# Patient Record
Sex: MALE | Race: WHITE | NOT HISPANIC OR LATINO | URBAN - METROPOLITAN AREA
[De-identification: names, ages, dates, MRNs, and addresses within clinical notes are randomized per-mention and may not be internally consistent; named-entity substitution may affect disease eponyms.]

---

## 2018-07-05 ENCOUNTER — EMERGENCY (EMERGENCY)
Facility: HOSPITAL | Age: 73
LOS: 0 days | Discharge: HOME | End: 2018-07-05
Admitting: PHYSICIAN ASSISTANT

## 2018-07-05 VITALS — HEIGHT: 69 IN | WEIGHT: 175.93 LBS

## 2018-07-05 VITALS
DIASTOLIC BLOOD PRESSURE: 72 MMHG | TEMPERATURE: 98 F | HEART RATE: 81 BPM | RESPIRATION RATE: 18 BRPM | SYSTOLIC BLOOD PRESSURE: 136 MMHG | OXYGEN SATURATION: 97 %

## 2018-07-05 DIAGNOSIS — H60.11 CELLULITIS OF RIGHT EXTERNAL EAR: ICD-10-CM

## 2018-07-05 DIAGNOSIS — L53.9 ERYTHEMATOUS CONDITION, UNSPECIFIED: ICD-10-CM

## 2018-07-05 DIAGNOSIS — Z87.891 PERSONAL HISTORY OF NICOTINE DEPENDENCE: ICD-10-CM

## 2018-07-05 RX ADMIN — Medication 300 MILLIGRAM(S): at 15:51

## 2018-07-05 NOTE — ED PROVIDER NOTE - OBJECTIVE STATEMENT
73 y.o. male with a PMHx of anxiety presented to the ER c/o erythema to Right outer ear for the past 2 days.  Pt denies fever, chills, d/c.  Pt was tx for a Right otitis externa 3 weeks ago which did improve.  Pt states he has been scratching and picking at dry skin to outer ear.

## 2018-07-05 NOTE — ED PROVIDER NOTE - ENMT, MLM
Airway patent, Nasal mucosa clear. Mouth with normal mucosa. Throat has no vesicles, no oropharyngeal exudates and uvula is midline.  TM's clear bilaterally.  No canal edema or d/c bilaterally.  No tragal or mastoid tenderness bilaterally.  (+) erythema/tenderness/warmth Right external auricle

## 2018-07-05 NOTE — ED PROVIDER NOTE - MEDICAL DECISION MAKING DETAILS
po Clindamycin; return to ER in 24 hours for re-check; any new or worsening symptoms, pt will return to ER po Clindamycin; return to ER in 24 hours for re-check; any new or worsening symptoms, pt will return to ER  Note complete

## 2018-07-06 ENCOUNTER — EMERGENCY (EMERGENCY)
Facility: HOSPITAL | Age: 73
LOS: 0 days | Discharge: HOME | End: 2018-07-06
Admitting: PHYSICIAN ASSISTANT

## 2018-07-06 VITALS
HEART RATE: 77 BPM | RESPIRATION RATE: 20 BRPM | TEMPERATURE: 96 F | SYSTOLIC BLOOD PRESSURE: 115 MMHG | OXYGEN SATURATION: 96 % | DIASTOLIC BLOOD PRESSURE: 58 MMHG

## 2018-07-06 DIAGNOSIS — H92.09 OTALGIA, UNSPECIFIED EAR: ICD-10-CM

## 2018-07-06 DIAGNOSIS — Z79.2 LONG TERM (CURRENT) USE OF ANTIBIOTICS: ICD-10-CM

## 2018-07-06 DIAGNOSIS — H60.11 CELLULITIS OF RIGHT EXTERNAL EAR: ICD-10-CM

## 2018-07-06 RX ORDER — AZTREONAM 2 G
1 VIAL (EA) INJECTION
Qty: 20 | Refills: 0 | OUTPATIENT
Start: 2018-07-06 | End: 2018-07-15

## 2018-07-06 NOTE — ED PROVIDER NOTE - PHYSICAL EXAMINATION
CONST: Well appearing in NAD  EYES: PERRL, EOMI, Sclera and conjunctiva clear.   ENT: No nasal discharge. TM's clear B/L without drainage. Oropharynx normal appearing, no erythema or exudates. Uvula midline. Right ear with cellulitis, swelling and drainage from pinna and helix, Canal is clear. No mastoid tenderness  NECK: Non-tender, no meningeal signs  MS: Normal ROM in all extremities. No midline spinal tenderness.  NEURO: A&Ox3, No focal deficits. Strength 5/5 with no sensory deficits. Steady gait

## 2018-07-06 NOTE — ED ADULT TRIAGE NOTE - CHIEF COMPLAINT QUOTE
Pt states he was told to return for follow up of right ear itching & swelling, was seen in ED yesterday, s/p po abx x 3 doses

## 2018-07-06 NOTE — ED PROVIDER NOTE - OBJECTIVE STATEMENT
pt with right ear swelling redness and drainage x4 days. Pt seen here yesterday and started on clindamycin and told to return today for re evaluation. Pt states that the redness receded but now is draining from helix. Denies fever or chills, Denies facial numbness or weakness. Denies DM.

## 2018-07-09 ENCOUNTER — INPATIENT (INPATIENT)
Facility: HOSPITAL | Age: 73
LOS: 1 days | Discharge: HOME | End: 2018-07-11
Attending: INTERNAL MEDICINE | Admitting: INTERNAL MEDICINE
Payer: MEDICARE

## 2018-07-09 VITALS
HEART RATE: 92 BPM | RESPIRATION RATE: 16 BRPM | DIASTOLIC BLOOD PRESSURE: 61 MMHG | OXYGEN SATURATION: 96 % | TEMPERATURE: 97 F | SYSTOLIC BLOOD PRESSURE: 137 MMHG

## 2018-07-09 LAB
ALBUMIN SERPL ELPH-MCNC: 4.7 G/DL — SIGNIFICANT CHANGE UP (ref 3.5–5.2)
ALP SERPL-CCNC: 44 U/L — SIGNIFICANT CHANGE UP (ref 30–115)
ALT FLD-CCNC: 36 U/L — SIGNIFICANT CHANGE UP (ref 0–41)
ANION GAP SERPL CALC-SCNC: 13 MMOL/L — SIGNIFICANT CHANGE UP (ref 7–14)
AST SERPL-CCNC: 39 U/L — SIGNIFICANT CHANGE UP (ref 0–41)
BASOPHILS # BLD AUTO: 0.08 K/UL — SIGNIFICANT CHANGE UP (ref 0–0.2)
BASOPHILS NFR BLD AUTO: 0.6 % — SIGNIFICANT CHANGE UP (ref 0–1)
BILIRUB SERPL-MCNC: <0.2 MG/DL — SIGNIFICANT CHANGE UP (ref 0.2–1.2)
BUN SERPL-MCNC: 15 MG/DL — SIGNIFICANT CHANGE UP (ref 10–20)
CALCIUM SERPL-MCNC: 9.1 MG/DL — SIGNIFICANT CHANGE UP (ref 8.5–10.1)
CHLORIDE SERPL-SCNC: 101 MMOL/L — SIGNIFICANT CHANGE UP (ref 98–110)
CO2 SERPL-SCNC: 25 MMOL/L — SIGNIFICANT CHANGE UP (ref 17–32)
CREAT SERPL-MCNC: 1.1 MG/DL — SIGNIFICANT CHANGE UP (ref 0.7–1.5)
EOSINOPHIL # BLD AUTO: 0.58 K/UL — SIGNIFICANT CHANGE UP (ref 0–0.7)
EOSINOPHIL NFR BLD AUTO: 4.6 % — SIGNIFICANT CHANGE UP (ref 0–8)
GLUCOSE SERPL-MCNC: 128 MG/DL — HIGH (ref 70–99)
HCT VFR BLD CALC: 43.8 % — SIGNIFICANT CHANGE UP (ref 42–52)
HGB BLD-MCNC: 14.5 G/DL — SIGNIFICANT CHANGE UP (ref 14–18)
IMM GRANULOCYTES NFR BLD AUTO: 0.5 % — HIGH (ref 0.1–0.3)
LACTATE SERPL-SCNC: 1.5 MMOL/L — SIGNIFICANT CHANGE UP (ref 0.5–2.2)
LYMPHOCYTES # BLD AUTO: 2.53 K/UL — SIGNIFICANT CHANGE UP (ref 1.2–3.4)
LYMPHOCYTES # BLD AUTO: 20 % — LOW (ref 20.5–51.1)
MCHC RBC-ENTMCNC: 29.4 PG — SIGNIFICANT CHANGE UP (ref 27–31)
MCHC RBC-ENTMCNC: 33.1 G/DL — SIGNIFICANT CHANGE UP (ref 32–37)
MCV RBC AUTO: 88.7 FL — SIGNIFICANT CHANGE UP (ref 80–94)
MONOCYTES # BLD AUTO: 0.97 K/UL — HIGH (ref 0.1–0.6)
MONOCYTES NFR BLD AUTO: 7.7 % — SIGNIFICANT CHANGE UP (ref 1.7–9.3)
NEUTROPHILS # BLD AUTO: 8.4 K/UL — HIGH (ref 1.4–6.5)
NEUTROPHILS NFR BLD AUTO: 66.6 % — SIGNIFICANT CHANGE UP (ref 42.2–75.2)
NRBC # BLD: 0 /100 WBCS — SIGNIFICANT CHANGE UP (ref 0–0)
PLATELET # BLD AUTO: 282 K/UL — SIGNIFICANT CHANGE UP (ref 130–400)
POTASSIUM SERPL-MCNC: 4.9 MMOL/L — SIGNIFICANT CHANGE UP (ref 3.5–5)
POTASSIUM SERPL-SCNC: 4.9 MMOL/L — SIGNIFICANT CHANGE UP (ref 3.5–5)
PROT SERPL-MCNC: 7.4 G/DL — SIGNIFICANT CHANGE UP (ref 6–8)
RBC # BLD: 4.94 M/UL — SIGNIFICANT CHANGE UP (ref 4.7–6.1)
RBC # FLD: 13 % — SIGNIFICANT CHANGE UP (ref 11.5–14.5)
SODIUM SERPL-SCNC: 139 MMOL/L — SIGNIFICANT CHANGE UP (ref 135–146)
WBC # BLD: 12.62 K/UL — HIGH (ref 4.8–10.8)
WBC # FLD AUTO: 12.62 K/UL — HIGH (ref 4.8–10.8)

## 2018-07-09 RX ORDER — VANCOMYCIN HCL 1 G
1000 VIAL (EA) INTRAVENOUS ONCE
Qty: 0 | Refills: 0 | Status: COMPLETED | OUTPATIENT
Start: 2018-07-09 | End: 2018-07-09

## 2018-07-09 RX ORDER — CEFTRIAXONE 500 MG/1
1 INJECTION, POWDER, FOR SOLUTION INTRAMUSCULAR; INTRAVENOUS ONCE
Qty: 0 | Refills: 0 | Status: COMPLETED | OUTPATIENT
Start: 2018-07-09 | End: 2018-07-09

## 2018-07-09 RX ADMIN — Medication 250 MILLIGRAM(S): at 20:33

## 2018-07-09 RX ADMIN — CEFTRIAXONE 100 GRAM(S): 500 INJECTION, POWDER, FOR SOLUTION INTRAMUSCULAR; INTRAVENOUS at 19:00

## 2018-07-09 NOTE — ED PROVIDER NOTE - PHYSICAL EXAMINATION
VITAL SIGNS: I have reviewed nursing notes and confirm.  CONSTITUTIONAL: elderly m, nad  SKIN: see ENT exam; remainder of skin exam is warm and dry, no acute rash.  HEAD: Normocephalic; atraumatic.  EYES: PERRL, EOM intact; conjunctiva and sclera clear.  ENT: +erythema/edema/desquamation of R pinna w/mild drainage/crusting, no discrete fluctuance, EAC/TM clear, erythema noted over post auricular area and L lateral neck, +ttp over mastoid, L eac/tm clear, no nasal discharge; airway clear.  NECK: Supple; non tender. no lad.  CARD: S1, S2 normal; no murmurs, gallops, or rubs. Regular rate and rhythm.  RESP: No wheezes, rales or rhonchi.  ABD: Normal bowel sounds; soft; non-distended; non-tender; no hepatosplenomegaly.  EXT: Normal ROM. No clubbing, cyanosis or edema.  NEURO: Alert, oriented. Grossly unremarkable. No focal deficits.  PSYCH: Cooperative, appropriate.

## 2018-07-09 NOTE — ED PROVIDER NOTE - MEDICAL DECISION MAKING DETAILS
worsening R outer ear cellulitis, failed po abx, now w/erythema spreading twd post auricular area/mastoid area - labs, ct omfs, iv abx, will likely require admission

## 2018-07-09 NOTE — ED PROVIDER NOTE - OBJECTIVE STATEMENT
73y m no pmh p/w worsening R ear cellulitis. Rpts erythema/desquamation of R outer ear pinna/helix, w/mild drainage from skin. Third ED visit for same in last 4 days, has been on clinda & bactrim w/o improvement. Was also dx w/OE a few weeks ago by outside provider, s/p abx drops. Now w/erythema spreading to post auricular area and down lateral neck. Denies f/c, ha, vision changes, rhinorrhea, cp/sob, nv, abd pain.

## 2018-07-09 NOTE — ED PROVIDER NOTE - NS ED ROS FT
Constitutional: No fever, chills, unintended weight loss.  Eyes:  No visual changes, eye pain or discharge.  ENMT:  see hpi  Cardiac:  No chest pain, SOB or edema. No chest pain with exertion.  Respiratory:  No cough or respiratory distress. No hemoptysis. No history of asthma or RAD.  GI:  No nausea, vomiting, diarrhea or abdominal pain.  :  No dysuria, frequency or burning.  MS:  No myalgia, muscle weakness, joint pain or back pain.  Neuro:  No headache or weakness.  No LOC.  Skin:  see hpi  Endocrine: No history of thyroid disease or diabetes.

## 2018-07-10 LAB
ANION GAP SERPL CALC-SCNC: 12 MMOL/L — SIGNIFICANT CHANGE UP (ref 7–14)
BASOPHILS # BLD AUTO: 0.08 K/UL — SIGNIFICANT CHANGE UP (ref 0–0.2)
BASOPHILS NFR BLD AUTO: 0.7 % — SIGNIFICANT CHANGE UP (ref 0–1)
BUN SERPL-MCNC: 13 MG/DL — SIGNIFICANT CHANGE UP (ref 10–20)
CALCIUM SERPL-MCNC: 9.2 MG/DL — SIGNIFICANT CHANGE UP (ref 8.5–10.1)
CHLORIDE SERPL-SCNC: 100 MMOL/L — SIGNIFICANT CHANGE UP (ref 98–110)
CO2 SERPL-SCNC: 26 MMOL/L — SIGNIFICANT CHANGE UP (ref 17–32)
CREAT SERPL-MCNC: 1.1 MG/DL — SIGNIFICANT CHANGE UP (ref 0.7–1.5)
EOSINOPHIL # BLD AUTO: 0.74 K/UL — HIGH (ref 0–0.7)
EOSINOPHIL NFR BLD AUTO: 6.8 % — SIGNIFICANT CHANGE UP (ref 0–8)
GLUCOSE SERPL-MCNC: 81 MG/DL — SIGNIFICANT CHANGE UP (ref 70–99)
HCT VFR BLD CALC: 41.6 % — LOW (ref 42–52)
HGB BLD-MCNC: 13.6 G/DL — LOW (ref 14–18)
IMM GRANULOCYTES NFR BLD AUTO: 0.5 % — HIGH (ref 0.1–0.3)
LACTATE SERPL-SCNC: 1.2 MMOL/L — SIGNIFICANT CHANGE UP (ref 0.5–2.2)
LYMPHOCYTES # BLD AUTO: 2.58 K/UL — SIGNIFICANT CHANGE UP (ref 1.2–3.4)
LYMPHOCYTES # BLD AUTO: 23.7 % — SIGNIFICANT CHANGE UP (ref 20.5–51.1)
MCHC RBC-ENTMCNC: 28.9 PG — SIGNIFICANT CHANGE UP (ref 27–31)
MCHC RBC-ENTMCNC: 32.7 G/DL — SIGNIFICANT CHANGE UP (ref 32–37)
MCV RBC AUTO: 88.3 FL — SIGNIFICANT CHANGE UP (ref 80–94)
MONOCYTES # BLD AUTO: 1.2 K/UL — HIGH (ref 0.1–0.6)
MONOCYTES NFR BLD AUTO: 11 % — HIGH (ref 1.7–9.3)
NEUTROPHILS # BLD AUTO: 6.22 K/UL — SIGNIFICANT CHANGE UP (ref 1.4–6.5)
NEUTROPHILS NFR BLD AUTO: 57.3 % — SIGNIFICANT CHANGE UP (ref 42.2–75.2)
NRBC # BLD: 0 /100 WBCS — SIGNIFICANT CHANGE UP (ref 0–0)
PLATELET # BLD AUTO: 285 K/UL — SIGNIFICANT CHANGE UP (ref 130–400)
POTASSIUM SERPL-MCNC: 4.7 MMOL/L — SIGNIFICANT CHANGE UP (ref 3.5–5)
POTASSIUM SERPL-SCNC: 4.7 MMOL/L — SIGNIFICANT CHANGE UP (ref 3.5–5)
RBC # BLD: 4.71 M/UL — SIGNIFICANT CHANGE UP (ref 4.7–6.1)
RBC # FLD: 12.9 % — SIGNIFICANT CHANGE UP (ref 11.5–14.5)
SODIUM SERPL-SCNC: 138 MMOL/L — SIGNIFICANT CHANGE UP (ref 135–146)
WBC # BLD: 10.87 K/UL — HIGH (ref 4.8–10.8)
WBC # FLD AUTO: 10.87 K/UL — HIGH (ref 4.8–10.8)

## 2018-07-10 PROCEDURE — 99223 1ST HOSP IP/OBS HIGH 75: CPT

## 2018-07-10 RX ORDER — CEFAZOLIN SODIUM 1 G
2000 VIAL (EA) INJECTION EVERY 6 HOURS
Qty: 0 | Refills: 0 | Status: DISCONTINUED | OUTPATIENT
Start: 2018-07-10 | End: 2018-07-11

## 2018-07-10 RX ORDER — TAMSULOSIN HYDROCHLORIDE 0.4 MG/1
0.4 CAPSULE ORAL AT BEDTIME
Qty: 0 | Refills: 0 | Status: DISCONTINUED | OUTPATIENT
Start: 2018-07-10 | End: 2018-07-11

## 2018-07-10 RX ORDER — CIPROFLOXACIN LACTATE 400MG/40ML
400 VIAL (ML) INTRAVENOUS EVERY 12 HOURS
Qty: 0 | Refills: 0 | Status: DISCONTINUED | OUTPATIENT
Start: 2018-07-10 | End: 2018-07-10

## 2018-07-10 RX ORDER — HEPARIN SODIUM 5000 [USP'U]/ML
5000 INJECTION INTRAVENOUS; SUBCUTANEOUS EVERY 8 HOURS
Qty: 0 | Refills: 0 | Status: DISCONTINUED | OUTPATIENT
Start: 2018-07-10 | End: 2018-07-11

## 2018-07-10 RX ORDER — ASPIRIN/CALCIUM CARB/MAGNESIUM 324 MG
81 TABLET ORAL DAILY
Qty: 0 | Refills: 0 | Status: DISCONTINUED | OUTPATIENT
Start: 2018-07-10 | End: 2018-07-11

## 2018-07-10 RX ORDER — CIPROFLOXACIN LACTATE 400MG/40ML
400 VIAL (ML) INTRAVENOUS ONCE
Qty: 0 | Refills: 0 | Status: COMPLETED | OUTPATIENT
Start: 2018-07-10 | End: 2018-07-10

## 2018-07-10 RX ORDER — ALPRAZOLAM 0.25 MG
0.5 TABLET ORAL
Qty: 0 | Refills: 0 | Status: DISCONTINUED | OUTPATIENT
Start: 2018-07-10 | End: 2018-07-11

## 2018-07-10 RX ORDER — CIPROFLOXACIN AND DEXAMETHASONE 3; 1 MG/ML; MG/ML
4 SUSPENSION/ DROPS AURICULAR (OTIC) EVERY 12 HOURS
Qty: 0 | Refills: 0 | Status: DISCONTINUED | OUTPATIENT
Start: 2018-07-10 | End: 2018-07-10

## 2018-07-10 RX ORDER — OFLOXACIN 0.3 %
4 DROPS OPHTHALMIC (EYE)
Qty: 0 | Refills: 0 | Status: DISCONTINUED | OUTPATIENT
Start: 2018-07-10 | End: 2018-07-10

## 2018-07-10 RX ORDER — MUPIROCIN 20 MG/G
1 OINTMENT TOPICAL EVERY 12 HOURS
Qty: 0 | Refills: 0 | Status: DISCONTINUED | OUTPATIENT
Start: 2018-07-10 | End: 2018-07-11

## 2018-07-10 RX ORDER — CIPROFLOXACIN LACTATE 400MG/40ML
VIAL (ML) INTRAVENOUS
Qty: 0 | Refills: 0 | Status: DISCONTINUED | OUTPATIENT
Start: 2018-07-10 | End: 2018-07-10

## 2018-07-10 RX ADMIN — Medication 200 MILLIGRAM(S): at 02:56

## 2018-07-10 RX ADMIN — HEPARIN SODIUM 5000 UNIT(S): 5000 INJECTION INTRAVENOUS; SUBCUTANEOUS at 23:18

## 2018-07-10 RX ADMIN — Medication 100 MILLIGRAM(S): at 20:58

## 2018-07-10 RX ADMIN — MUPIROCIN 1 APPLICATION(S): 20 OINTMENT TOPICAL at 23:17

## 2018-07-10 RX ADMIN — TAMSULOSIN HYDROCHLORIDE 0.4 MILLIGRAM(S): 0.4 CAPSULE ORAL at 23:19

## 2018-07-10 RX ADMIN — Medication 100 MILLIGRAM(S): at 23:18

## 2018-07-10 RX ADMIN — HEPARIN SODIUM 5000 UNIT(S): 5000 INJECTION INTRAVENOUS; SUBCUTANEOUS at 06:27

## 2018-07-10 RX ADMIN — Medication 81 MILLIGRAM(S): at 11:40

## 2018-07-10 RX ADMIN — HEPARIN SODIUM 5000 UNIT(S): 5000 INJECTION INTRAVENOUS; SUBCUTANEOUS at 15:35

## 2018-07-10 NOTE — CONSULT NOTE ADULT - ASSESSMENT
Pt is a 73 y.o male with R perichondritis/chondritis, no OE.    ·	cont IV abx, consider adding MRSA coverage  ·	no need for ear drops  ·	ID c/s  ·	w/d with attng Pt is a 73 y.o male with R perichondritis/chondritis, no OE.    ·	cont IV abx, consider adding MRSA coverage  ·	Bactroban ointment to ear  ·	no need for ear drops  ·	ID c/s  ·	w/d with attng

## 2018-07-10 NOTE — H&P ADULT - ASSESSMENT
72 yo M hx of recurrent ear infection presents with worsening right ear cellulitis.      Recurrent right otitis externa  - CT Maxillofacial: Bilateral mastoid air cells are well-aerated without evidence of mastoiditis.  - c/w IV Cipro 400mg q12h  - ID consulted.       Diet: Regular diet.   DVT ppx:   GI ppx: not indicated.   Code: Full code. 72 yo M hx of recurrent ear infection presents with worsening right ear cellulitis.      Recurrent right otitis externa  - CT Maxillofacial: Bilateral mastoid air cells are well-aerated without evidence of mastoiditis.  - c/w IV Cipro 400mg q12h  - f/u BCx  - ID consulted.       Diet: Regular diet.   DVT ppx: heparin SC  GI ppx: not indicated.   Code: Full code. 74 yo M no pertinent PMH presents with worsening right ear cellulitis.      Recurrent right otitis externa  - CT Maxillofacial: Bilateral mastoid air cells are well-aerated without evidence of mastoiditis.  - c/w IV Cipro 400mg q12h  - f/u BCx  - ID consulted.       Diet: Regular diet.   DVT ppx: heparin SC  GI ppx: not indicated.   Code: Full code. 74 yo M no pertinent PMH presents with worsening right ear cellulitis.      Recurrent right otitis externa  - CT Maxillofacial: Bilateral mastoid air cells are well-aerated without evidence of mastoiditis.  - c/w IV Cipro 400mg q12h  - f/u BCx  - ID consulted.     BPH  - pt is taking rapaflo 8mg once daily  - c/w tamsulosin 0.4mg once daily.     Depression  - patient is taking Viibryd at home.       Diet: Regular diet.   DVT ppx: heparin SC  GI ppx: not indicated.   Code: Full code.

## 2018-07-10 NOTE — CONSULT NOTE ADULT - ATTENDING COMMENTS
right perichondritis. Medical treatment at this time.
I have personally examined the patient and reviewed the documentation above.  Corrections and edits were made wherever needed.

## 2018-07-10 NOTE — CONSULT NOTE ADULT - SUBJECTIVE AND OBJECTIVE BOX
LELAND BERMEO  73y, Male  Allergy: No Known Allergies      HPI:  74 yo M with no pertinent PMH presents with worsening right ear cellulitis. Patient reports that it started about 3 weeks ago with a "pimple" which he burst, then he started noticing erythema/desquamation of right outer ear pinna/helix. A few day prior, he was swimming in his friend's pool. He was treating by himself at home. When it got worse and started draining clear fluid, he went to his PCP who prescribed him abx drops. He was using the drops for about 1 wk without improvement in symptoms. The patient then visit to ED but discharge home on PO abx (bactrim/clinda). Patient reports that despite PO abx, his symptoms didn't improved so he decided to come back to ED again. Never had ear infection in the past.   Denied fever, chills, H/A, hearing changes, vision changes, rhinorrhea chest pain, SOB. (10 Jul 2018 04:23)    FAMILY HISTORY:    PAST MEDICAL & SURGICAL HISTORY:  Depression  BPH (benign prostatic hyperplasia)  Anxiety  No significant past surgical history        VITALS:  T(F): 96.6, Max: 98.6 (07-10-18 @ 02:39)  HR: 68  BP: 129/60  RR: 18Vital Signs Last 24 Hrs  T(C): 35.9 (10 Jul 2018 07:51), Max: 37 (10 Jul 2018 02:39)  T(F): 96.6 (10 Jul 2018 07:51), Max: 98.6 (10 Jul 2018 02:39)  HR: 68 (10 Jul 2018 07:51) (68 - 75)  BP: 129/60 (10 Jul 2018 07:51) (122/76 - 129/60)  BP(mean): --  RR: 18 (10 Jul 2018 07:51) (18 - 18)  SpO2: 95% (10 Jul 2018 07:51) (95% - 98%)    TESTS & MEASUREMENTS:                        13.6   10.87 )-----------( 285      ( 10 Jul 2018 11:19 )             41.6     07-10    138  |  100  |  13  ----------------------------<  81  4.7   |  26  |  1.1    Ca    9.2      10 Jul 2018 11:19    TPro  7.4  /  Alb  4.7  /  TBili  <0.2  /  DBili  x   /  AST  39  /  ALT  36  /  AlkPhos  44  07-09    LIVER FUNCTIONS - ( 09 Jul 2018 17:32 )  Alb: 4.7 g/dL / Pro: 7.4 g/dL / ALK PHOS: 44 U/L / ALT: 36 U/L / AST: 39 U/L / GGT: x             PHYSICAL EXAM:  HEENT: L ear no erythema/induration/abrasions; R entire ear edematous (including pinna, tragus, lobe), erythema of ear with no spreading to cheeks or forehead, yellowish crusting over ear, no vesicles or pustules noted, small abrasion over outer helix no pus, as per pt - no difference in hearing b/l      RADIOLOGY & ADDITIONAL TESTS:      ANTIBIOTICS:  ciprofloxacin   IVPB 400 milliGRAM(s) IV Intermittent every 12 hours  ciprofloxacin   IVPB

## 2018-07-10 NOTE — H&P ADULT - NSHPLABSRESULTS_GEN_ALL_CORE
14.5   12.62 )-----------( 282      ( 09 Jul 2018 17:32 )             43.8             07-09    139  |  101  |  15  ----------------------------<  128<H>  4.9   |  25  |  1.1    Ca    9.1      09 Jul 2018 17:32    TPro  7.4  /  Alb  4.7  /  TBili  <0.2  /  DBili  x   /  AST  39  /  ALT  36  /  AlkPhos  44  07-09    LIVER FUNCTIONS - ( 09 Jul 2018 17:32 )  Alb: 4.7 g/dL / Pro: 7.4 g/dL / ALK PHOS: 44 U/L / ALT: 36 U/L / AST: 39 U/L / GGT: x

## 2018-07-10 NOTE — H&P ADULT - ATTENDING COMMENTS
74 yo M with no pertinent PMH presents with worsening right ear cellulitis. Patient reports that it started about 3 weeks ago when he started noticing erythema/desquamation of right outer ear pinna/helix. A few day prior, he was swimming in his friend's pool. He was treating by himself at home. When it got worse and started draining clear fluid, he went to his PCP who prescribed him abx drops. He was using the drops for about 1 wk without improvement in symptoms. The patient then visit to ED but discharge home on PO abx (bactrim/clinda). Patient reports that despite PO abx, his symptoms didn't improved so he decided to come back to ED again. Never had ear infection in the past.   Denied fever, chills, H/A, hearing changes, vision changes, rhinorrhea chest pain, SOB.    REVIEW OF SYSTEMS:see cc HPI  CONSTITUTIONAL: No weakness, fevers or chills  EYES/ENT: No visual changes;  No vertigo or throat pain, swelling and infection of R pinna  NECK: No pain or stiffness  RESPIRATORY: No cough, wheezing, hemoptysis; No shortness of breath  CARDIOVASCULAR: No chest pain or palpitations  GASTROINTESTINAL: No abdominal or epigastric pain. No nausea, vomiting, or hematemesis; No diarrhea or constipation. No melena or hematochezia.  GENITOURINARY: No dysuria, frequency or hematuria  NEUROLOGICAL: No numbness or weakness  SKIN: No itching,(+) rashes, (+) swelling/erythema of the R pinna,     Physical Exam:  General: WN/WD NAD  Neurology: A&Ox3, nonfocal, follows commands  Eyes: PERRLA/ EOMI  ENT/Neck: Neck supple, trachea midline, No JVD, R ear swelling, erythema and tenderness w/ erythema extending to the neck area  Respiratory: CTA B/L, No wheezing, rales, rhonchi  CV: Normal rate regular rhythm, S1S2, no murmurs, rubs or gallops  Abdominal: Soft, NT, ND +BS,   Extremities: No edema, + peripheral pulses  Skin: No Rashes, Hematoma, Ecchymosis, see ENT area  Incisions: n/a  Tubes: n/a    A/P  R pinna cellulitis   - IV Abx and Otic drops  -ID eval   -Blood cultures  -ENT eval     Depression  - c/w outpatient Rx     BPH   - c/w outpatient Rx    DVT prophylaxis

## 2018-07-10 NOTE — H&P ADULT - NSHPREVIEWOFSYSTEMS_GEN_ALL_CORE
REVIEW OF SYSTEMS:    CONSTITUTIONAL: No weakness, fevers or chills  EYES/ENT: No visual changes;  No vertigo or throat pain. Right ear pain and discharge.   NECK: No pain or stiffness  RESPIRATORY: No cough, wheezing, hemoptysis; No shortness of breath  CARDIOVASCULAR: No chest pain or palpitations  GASTROINTESTINAL: No abdominal or epigastric pain. No nausea, vomiting, or hematemesis; No diarrhea or constipation. No melena or hematochezia.  GENITOURINARY: No dysuria, frequency or hematuria  NEUROLOGICAL: No numbness or weakness  SKIN: No itching, rashes REVIEW OF SYSTEMS:    CONSTITUTIONAL: No weakness, fevers or chills  EYES/ENT: No visual changes;  No vertigo or throat pain. Right ear pain/tenderness and discharge.   NECK: No pain or stiffness  RESPIRATORY: No cough, wheezing, hemoptysis; No shortness of breath  CARDIOVASCULAR: No chest pain or palpitations  GASTROINTESTINAL: No abdominal or epigastric pain. No nausea, vomiting, or hematemesis; No diarrhea or constipation. No melena or hematochezia.  GENITOURINARY: No dysuria, frequency or hematuria  NEUROLOGICAL: No numbness or weakness  SKIN: No itching, rashes

## 2018-07-10 NOTE — H&P ADULT - HISTORY OF PRESENT ILLNESS
74 yo M hx of recurrent ear infection presents with worsening right ear cellulitis. Patient reports that there's erythema/desquamation of R outer ear pinna/helix, w/mild drainage from skin. Pt was tx for a Right otitis externa 3 weeks ago which improved.  Third ED visit for same in last 4 days, has been on clinda & bactrim w/o improvement. Was also dx w/OE a few weeks ago by outside provider, s/p abx drops. Now w/erythema spreading to post auricular area and down lateral neck. Denies f/c, ha, vision changes, rhinorrhea, cp/sob, nv, abd pain. 74 yo M hx of recurrent ear infection presents with worsening right ear cellulitis. Patient reports that it started about 3 weeks ago when he started noticing erythema/desquamation of right outer ear pinna/helix. A few day prior, he was swimming in his friend's pool. He was treating by himself at home. When it got worse and started draining clear fluid, he went to his PCP who prescribed him abx drops. He was using the drops for about 1 wk without improvement in symptoms. The patient then visit to ED but discharge home on PO abx (bactrim/clinda). Patient reports that despite PO abx, his symptoms didn't improved so he decided to come back to ED again.   Denied fever, chills, H/A, hearing changes, vision changes, rhinorrhea chest pain, SOB. 74 yo M with no pertinent PMH presents with worsening right ear cellulitis. Patient reports that it started about 3 weeks ago when he started noticing erythema/desquamation of right outer ear pinna/helix. A few day prior, he was swimming in his friend's pool. He was treating by himself at home. When it got worse and started draining clear fluid, he went to his PCP who prescribed him abx drops. He was using the drops for about 1 wk without improvement in symptoms. The patient then visit to ED but discharge home on PO abx (bactrim/clinda). Patient reports that despite PO abx, his symptoms didn't improved so he decided to come back to ED again. Never had ear infection in the past.   Denied fever, chills, H/A, hearing changes, vision changes, rhinorrhea chest pain, SOB.

## 2018-07-10 NOTE — CONSULT NOTE ADULT - ASSESSMENT
Pt is 72yo M with R ear cellulitis after opening a pimple with no signs of impact on hearing. Leukocytosis 13 > 10 in ED, hemodynamic stable, afebrile.    PLAN:   - D/c ciprofloxacin, ofloxacin solution  - start Ancef 2g q6h  - start Bactroban cream q12h to R ear  - continue to monitor CBC Pt is 72yo M with R ear cellulitis after opening a pimple with no signs of impact on hearing. Leukocytosis 13 > 10 in ED, hemodynamic stable, afebrile.  Differential diagnosis includes perichondritis, Leija Hunt syndrome, mastoiditis, otitis externa    PLAN:   - D/c ciprofloxacin, ofloxacin solution  - start Ancef 2g q6h  - start Bactroban cream q12h to R ear  - continue to monitor CBC

## 2018-07-10 NOTE — CONSULT NOTE ADULT - COMMENTS
Left ear with edema/ erythema of entire ear including the pinna, no vesicles, tender to palpation, desquamation, no drainage from ear canal. Has involvement of the preauricular and post auricular area too.

## 2018-07-10 NOTE — H&P ADULT - NSHPSOCIALHISTORY_GEN_ALL_CORE
Alcohol -   Smoke -   Illicit drug - Alcohol - Social drink.   Smoke - smoked 1/2 pack a day for 20 years (quitted 8 mo ago)  Illicit drug - Denied.

## 2018-07-10 NOTE — H&P ADULT - NSHPPHYSICALEXAM_GEN_ALL_CORE
REVIEW OF SYSTEMS:    CONSTITUTIONAL: No weakness, fevers or chills  EYES/ENT: No visual changes;  No vertigo or throat pain   NECK: No pain or stiffness  RESPIRATORY: No cough, wheezing, hemoptysis; No shortness of breath  CARDIOVASCULAR: No chest pain or palpitations  GASTROINTESTINAL: No abdominal or epigastric pain. No nausea, vomiting, or hematemesis; No diarrhea or constipation. No melena or hematochezia.  GENITOURINARY: No dysuria, frequency or hematuria  NEUROLOGICAL: No numbness or weakness  SKIN: No itching, rashes PHYSICAL EXAM:  GEN: No acute distress.  HEENT: EOMI. Right ear swelling with erythema and crusted. Tenderness to palpation. TM looks clear (no erythema).  LUNGS: Clear to auscultation bilaterally.  HEART: S1/S2 present. RRR.   ABD: Soft, non-tender, non-distended. Bowel sounds present  EXT: No pitting edema.   NEURO: AAO x 4.

## 2018-07-10 NOTE — CONSULT NOTE ADULT - SUBJECTIVE AND OBJECTIVE BOX
Pt is a 73 y.o male admitted with recurrent "otitis externa" for the past few weeks. As per patient, he has been on some ear drops and PO abx, and has been using Bacitracin ointment without any relief. PT has been to the ED multiple times without any improvement. PT this AM feels better after getting some IV abx (admitted last night). PT denies any drainage from the ear, just has external itchiness and drainage. PT denies any fevers at home. Pt has never had any ear infections prior to this.    PAST MEDICAL: Depression, BPH, Anxiety  SURGICAL HISTORY: No significant past surgical history  MEDICATIONS (HOME): Aspirin (81), Rapaflo, Viibryd, Xanax  MEDICATIONS (STANDING):  Aspirin chewable 81 milliGRAM(s) Oral daily  Ciprofloxacin IVPB 400 milliGRAM(s) IV Intermittent every 12 hours  Dexamethasone 0.1% Ophthalmic Solution for OTIC Use 4 Drop(s) Right Ear two times a day  Heparin Injectable 5000 Unit(s) SubCutaneous every 8 hours  Ofloxacin 0.3% Solution 4 Drop(s) Right Ear two times a day  Tamsulosin 0.4 milliGRAM(s) Oral at bedtime  ALLERGIES: NKDA    Vital Signs: T(F): 96.6 (10 Jul 2018 07:51), Max: 98.6 (10 Jul 2018 02:39), HR: 68, BP: 129/60, RR: 18, SpO2: 95%  GEN: NAD, awake and alert  HEENT: NC/AT, oral mucosa pink, no erythema/edema, uvula midline;   R external ear with erythema/mild edema and dry scaly/skin extending to post auricular area and down to neck, no areas of drainage, some mucosal bleeding noted; no TTP over pre/post auricular or tragal area; EAC WNL, no edema, erythema, TM intact. L ear externally WNL, no pre/post auricular lesions, EAC WNL, TM intact                          13.6   10.87 )-----------( 285      ( 10 Jul 2018 11:19 )             41.6

## 2018-07-11 VITALS
HEART RATE: 78 BPM | RESPIRATION RATE: 16 BRPM | SYSTOLIC BLOOD PRESSURE: 133 MMHG | TEMPERATURE: 96 F | DIASTOLIC BLOOD PRESSURE: 58 MMHG

## 2018-07-11 LAB
ANION GAP SERPL CALC-SCNC: 10 MMOL/L — SIGNIFICANT CHANGE UP (ref 7–14)
BASOPHILS # BLD AUTO: 0.08 K/UL — SIGNIFICANT CHANGE UP (ref 0–0.2)
BASOPHILS NFR BLD AUTO: 0.9 % — SIGNIFICANT CHANGE UP (ref 0–1)
BUN SERPL-MCNC: 16 MG/DL — SIGNIFICANT CHANGE UP (ref 10–20)
CALCIUM SERPL-MCNC: 8.7 MG/DL — SIGNIFICANT CHANGE UP (ref 8.5–10.1)
CHLORIDE SERPL-SCNC: 98 MMOL/L — SIGNIFICANT CHANGE UP (ref 98–110)
CO2 SERPL-SCNC: 26 MMOL/L — SIGNIFICANT CHANGE UP (ref 17–32)
CREAT SERPL-MCNC: 1.1 MG/DL — SIGNIFICANT CHANGE UP (ref 0.7–1.5)
EOSINOPHIL # BLD AUTO: 0.65 K/UL — SIGNIFICANT CHANGE UP (ref 0–0.7)
EOSINOPHIL NFR BLD AUTO: 7.1 % — SIGNIFICANT CHANGE UP (ref 0–8)
GLUCOSE SERPL-MCNC: 95 MG/DL — SIGNIFICANT CHANGE UP (ref 70–99)
HCT VFR BLD CALC: 37.8 % — LOW (ref 42–52)
HGB BLD-MCNC: 12.5 G/DL — LOW (ref 14–18)
IMM GRANULOCYTES NFR BLD AUTO: 0.4 % — HIGH (ref 0.1–0.3)
LYMPHOCYTES # BLD AUTO: 2.49 K/UL — SIGNIFICANT CHANGE UP (ref 1.2–3.4)
LYMPHOCYTES # BLD AUTO: 27.4 % — SIGNIFICANT CHANGE UP (ref 20.5–51.1)
MCHC RBC-ENTMCNC: 29.2 PG — SIGNIFICANT CHANGE UP (ref 27–31)
MCHC RBC-ENTMCNC: 33.1 G/DL — SIGNIFICANT CHANGE UP (ref 32–37)
MCV RBC AUTO: 88.3 FL — SIGNIFICANT CHANGE UP (ref 80–94)
MONOCYTES # BLD AUTO: 1.07 K/UL — HIGH (ref 0.1–0.6)
MONOCYTES NFR BLD AUTO: 11.8 % — HIGH (ref 1.7–9.3)
NEUTROPHILS # BLD AUTO: 4.77 K/UL — SIGNIFICANT CHANGE UP (ref 1.4–6.5)
NEUTROPHILS NFR BLD AUTO: 52.4 % — SIGNIFICANT CHANGE UP (ref 42.2–75.2)
NRBC # BLD: 0 /100 WBCS — SIGNIFICANT CHANGE UP (ref 0–0)
PLATELET # BLD AUTO: 233 K/UL — SIGNIFICANT CHANGE UP (ref 130–400)
POTASSIUM SERPL-MCNC: 4.4 MMOL/L — SIGNIFICANT CHANGE UP (ref 3.5–5)
POTASSIUM SERPL-SCNC: 4.4 MMOL/L — SIGNIFICANT CHANGE UP (ref 3.5–5)
RBC # BLD: 4.28 M/UL — LOW (ref 4.7–6.1)
RBC # FLD: 12.8 % — SIGNIFICANT CHANGE UP (ref 11.5–14.5)
SODIUM SERPL-SCNC: 134 MMOL/L — LOW (ref 135–146)
WBC # BLD: 9.1 K/UL — SIGNIFICANT CHANGE UP (ref 4.8–10.8)
WBC # FLD AUTO: 9.1 K/UL — SIGNIFICANT CHANGE UP (ref 4.8–10.8)

## 2018-07-11 RX ORDER — AZTREONAM 2 G
2 VIAL (EA) INJECTION
Qty: 40 | Refills: 0 | OUTPATIENT
Start: 2018-07-11 | End: 2018-07-20

## 2018-07-11 RX ORDER — MUPIROCIN 20 MG/G
1 OINTMENT TOPICAL
Qty: 15 | Refills: 0 | OUTPATIENT
Start: 2018-07-11

## 2018-07-11 RX ADMIN — Medication 81 MILLIGRAM(S): at 11:10

## 2018-07-11 RX ADMIN — MUPIROCIN 1 APPLICATION(S): 20 OINTMENT TOPICAL at 05:32

## 2018-07-11 RX ADMIN — Medication 100 MILLIGRAM(S): at 05:31

## 2018-07-11 NOTE — DISCHARGE NOTE ADULT - ADDITIONAL INSTRUCTIONS
Please follow up with your primary care doctor in 1 week for basic metabolic panel to check your potassium and creatinine, as the antibiotic Bactrim can affect the kidney and electrolytes.

## 2018-07-11 NOTE — PROGRESS NOTE ADULT - SUBJECTIVE AND OBJECTIVE BOX
ELVIE LELAND  73y, Male      OVERNIGHT EVENTS:    feels better.    VITALS:  T(F): 97.5, Max: 99.1 (07-10-18 @ 17:16)  HR: 77  BP: 120/56  RR: 18Vital Signs Last 24 Hrs  T(C): 36.4 (11 Jul 2018 05:51), Max: 37.3 (10 Jul 2018 17:16)  T(F): 97.5 (11 Jul 2018 05:51), Max: 99.1 (10 Jul 2018 17:16)  HR: 77 (11 Jul 2018 05:51) (73 - 77)  BP: 120/56 (11 Jul 2018 05:51) (103/70 - 133/69)  BP(mean): --  RR: 18 (11 Jul 2018 05:51) (18 - 18)  SpO2: 96% (10 Jul 2018 17:16) (96% - 96%)    TESTS & MEASUREMENTS:                        12.5   9.10  )-----------( 233      ( 11 Jul 2018 06:30 )             37.8     07-11    134<L>  |  98  |  16  ----------------------------<  95  4.4   |  26  |  1.1    Ca    8.7      11 Jul 2018 06:30    TPro  7.4  /  Alb  4.7  /  TBili  <0.2  /  DBili  x   /  AST  39  /  ALT  36  /  AlkPhos  44  07-09    LIVER FUNCTIONS - ( 09 Jul 2018 17:32 )  Alb: 4.7 g/dL / Pro: 7.4 g/dL / ALK PHOS: 44 U/L / ALT: 36 U/L / AST: 39 U/L / GGT: x                   RADIOLOGY & ADDITIONAL TESTS:    ANTIBIOTICS:

## 2018-07-11 NOTE — DISCHARGE NOTE ADULT - PLAN OF CARE
[facility] You were admitted with a working diagnosis of otitis externa, which has improved since admission. Your white cell count has decreased, and the inflammation and redness has decreased. You are being prescribed a 10 day course of bactrim and clindamycin. Please consume with food. Additionally, you are being prescribed prednisone for 5 days to help with the inflammation. Continue to use the Bactroban cream two times per day. If your condition does no improve by the end of your antibiotic regimen, please seek medical attention as soon as possible. You were admitted with a working diagnosis of otitis externa, which has improved since admission. Your white cell count has decreased, and the inflammation and redness has decreased. You are being prescribed a 10 day course of bactrim and clindamycin. Additionally, you are being prescribed prednisone for 5 days to help with the inflammation. Continue to use the Bactroban cream two times per day. If your condition does no improve by the end of your antibiotic regimen, please seek medical attention as soon as possible.

## 2018-07-11 NOTE — DISCHARGE NOTE ADULT - MEDICATION SUMMARY - MEDICATIONS TO TAKE
I will START or STAY ON the medications listed below when I get home from the hospital:    predniSONE 20 mg oral tablet  -- 1 tab(s) by mouth once a day   -- It is very important that you take or use this exactly as directed.  Do not skip doses or discontinue unless directed by your doctor.  Obtain medical advice before taking any non-prescription drugs as some may affect the action of this medication.  Take with food or milk.    -- Indication: For Otitis externa    aspirin 81 mg oral tablet, chewable  -- 1 tab(s) by mouth once a day  -- Indication: For Prevention    Rapaflo 8 mg oral capsule  -- 1 cap(s) by mouth once a day  -- Indication: For BPH (benign prostatic hyperplasia)    Viibryd 40 mg oral tablet  -- 1 tab(s) by mouth once a day  -- Indication: For Depression    Xanax 0.5 mg oral tablet  -- 1 tab(s) by mouth 2 times a day, As Needed  -- Indication: For Anxiety    clindamycin 300 mg oral capsule  -- 1 cap(s) by mouth every 8 hours   -- Finish all this medication unless otherwise directed by prescriber.  Medication should be taken with plenty of water.    -- Indication: For Otitis externa    Bactrim  mg-160 mg oral tablet  -- 2 tab(s) by mouth every 12 hours   -- Avoid prolonged or excessive exposure to direct and/or artificial sunlight while taking this medication.  Finish all this medication unless otherwise directed by prescriber.  Medication should be taken with plenty of water.    -- Indication: For Otitis externa I will START or STAY ON the medications listed below when I get home from the hospital:    predniSONE 20 mg oral tablet  -- 1 tab(s) by mouth once a day   -- It is very important that you take or use this exactly as directed.  Do not skip doses or discontinue unless directed by your doctor.  Obtain medical advice before taking any non-prescription drugs as some may affect the action of this medication.  Take with food or milk.    -- Indication: For Otitis externa    aspirin 81 mg oral tablet, chewable  -- 1 tab(s) by mouth once a day  -- Indication: For Prevention    Rapaflo 8 mg oral capsule  -- 1 cap(s) by mouth once a day  -- Indication: For BPH (benign prostatic hyperplasia)    Viibryd 40 mg oral tablet  -- 1 tab(s) by mouth once a day  -- Indication: For Depression    Xanax 0.5 mg oral tablet  -- 1 tab(s) by mouth 2 times a day, As Needed  -- Indication: For Anxiety    mupirocin 2% topical ointment  -- 1 application on skin every 12 hours  -- Indication: For Otitis externa    clindamycin 300 mg oral capsule  -- 1 cap(s) by mouth every 8 hours   -- Finish all this medication unless otherwise directed by prescriber.  Medication should be taken with plenty of water.    -- Indication: For Otitis externa    Bactrim  mg-160 mg oral tablet  -- 2 tab(s) by mouth every 12 hours   -- Avoid prolonged or excessive exposure to direct and/or artificial sunlight while taking this medication.  Finish all this medication unless otherwise directed by prescriber.  Medication should be taken with plenty of water.    -- Indication: For Otitis externa

## 2018-07-11 NOTE — DISCHARGE NOTE ADULT - OTHER SIGNIFICANT FINDINGS
Patient is a 73 year old male with no pertinent PMHx who presented with right ear erythema and edema x3 weeks. He was seen by his PCP and treated with ear drops without resolution. He then presented to the ED and was discharged on oral antibiotics without improvement. As an inpatient, he was admitted for 1 day and improved with regard to edema and pain. As per ID, he should be discharged with bactrim DS 2 tabs Q12H and Clindamycin 300mg Q8H for 10 days. He is also being prescribed prednisone 20mg PO x5days and should continue applying his Bactroban ointment BID.

## 2018-07-11 NOTE — DISCHARGE NOTE ADULT - CARE PROVIDER_API CALL
Fabian Collins  [address] [place] [address], [place] [address]  Phone: [phone]  Fax: (   )    -

## 2018-07-11 NOTE — DISCHARGE NOTE ADULT - CARE PLAN
Principal Discharge DX:	Otitis externa  Goal:	Resolution  Assessment and plan of treatment:	You were admitted with a working diagnosis of otitis externa, which has improved since admission. Your white cell count has decreased, and the inflammation and redness has decreased. You are being prescribed a 10 day course of bactrim and clindamycin. Please consume with food. Additionally, you are being prescribed prednisone for 5 days to help with the inflammation. Continue to use the Bactroban cream two times per day. If your condition does no improve by the end of your antibiotic regimen, please seek medical attention as soon as possible. Principal Discharge DX:	Otitis externa  Goal:	Resolution  Assessment and plan of treatment:	You were admitted with a working diagnosis of otitis externa, which has improved since admission. Your white cell count has decreased, and the inflammation and redness has decreased. You are being prescribed a 10 day course of bactrim and clindamycin. Additionally, you are being prescribed prednisone for 5 days to help with the inflammation. Continue to use the Bactroban cream two times per day. If your condition does no improve by the end of your antibiotic regimen, please seek medical attention as soon as possible.

## 2018-07-11 NOTE — DISCHARGE NOTE ADULT - HOSPITAL COURSE
EXAM:  CT ORBITS IC          PROCEDURE DATE:  07/09/2018      INTERPRETATION:  Clinical History / Reason for exam: Evaluate for   mastoiditis.    Technique: Multiple axial CT images of the temporal bones was performed   after the administration of 100 cc Optiray 320 intravenous contrast..   Sagittal and coronal reformatted images are provided.    There are no comparisons available.    Findings:  Right temporal bone: The external auditory canal is unremarkable in   appearance. The middle ear cavity is patent without significant   opacification. The mastoid air cells are patent without significant   opacification. The ossicles are intact. The internal auditory canal,   cochlea, semicircular canals and the facial canal are unremarkable in   appearance.    Left temporal bone: The external auditory canal is unremarkable in   appearance. The middle ear cavity is patent without significant   opacification. The mastoid air cells are patent without significant   opacification. The ossicles are intact. The internal auditory canal,   cochlea, semicircular canals and the facial canal are unremarkable in   appearance.    Impression:  Unremarkable CT of the temporal bones.

## 2018-07-11 NOTE — DISCHARGE NOTE ADULT - PATIENT PORTAL LINK FT
You can access the [facility] Patient Portal, offered by [facility], by registering with the following website: [email or web address]

## 2018-07-11 NOTE — DISCHARGE NOTE ADULT - PROVIDER TOKENS
FREE:[LAST:[Karina],FIRST:[Fabian],PHONE:[phone]],FAX:[(   )    -],ADDRESS:[[address] [place] [id number]]]

## 2018-07-11 NOTE — PROGRESS NOTE ADULT - ASSESSMENT
74 yo M no pertinent PMH presents with worsening right ear cellulitis.      Recurrent right otitis externa  - CT Maxillofacial: Bilateral mastoid air cells are well-aerated without evidence of mastoiditis.  - s/p IV Cipro 400mg q12h  - ID eval noted. would switch IV-PO Abx.    BPH  - pt is taking rapaflo 8mg once daily  - c/w tamsulosin 0.4mg once daily.     Depression  - patient is taking Viibryd at home.       Diet: Regular diet.   DVT ppx: heparin SC  GI ppx: not indicated.   Code: Full code.    d/c planning please.
IMPRESSION:  Resolving cellulitis  PLAN:   - Change iv to po Clindamycin 300 mg q8h and Bactrim 2 DS tablets q12h for 10 more days.   Bactroban cream q12h to R ear  -Recall prn please.

## 2018-07-11 NOTE — PROGRESS NOTE ADULT - SUBJECTIVE AND OBJECTIVE BOX
LELAND BERMEO  73y  Male      Patient is a 73y old  Male who presents with a chief complaint of Right ear pain (11 Jul 2018 09:46)      INTERVAL HPI/OVERNIGHT EVENTS:      ******************************* REVIEW OF SYSTEMS:**********************************************    feels better.  All other review of systems negative    *********************** VITALS ******************************************    T(F): 97.5 (07-11-18 @ 05:51)  HR: 77 (07-11-18 @ 05:51) (73 - 77)  BP: 120/56 (07-11-18 @ 05:51) (103/70 - 133/69)  RR: 18 (07-11-18 @ 05:51) (18 - 18)  SpO2: 96% (07-10-18 @ 17:16) (96% - 96%)            ******************************** PHYSICAL EXAM:**************************************************  GENERAL: NAD    PSYCH: no agitation, baseline mentation  HEENT: decreased right ear tenderness/ eryhema    NERVOUS SYSTEM:  Alert & Oriented X3, MS  5/5 B/L  UE and LE ; Sensory intact    PULMONARY: ORTIZ, CTA    CARDIOVASCULAR: S1S2 RRR    GI: Soft, NT, ND; BS present.    EXTREMITIES:  2+ Peripheral Pulses, No clubbing, cyanosis, or edema    LYMPH: No lymphadenopathy noted    SKIN: as above.    ******************************************************************************************    Consultant(s) Notes Reviewed:  [x ] YES  [ ] NO    Discussed with Consultants/Other Providers [ x] YES     **************************** LABS *******************************************************                          12.5   9.10  )-----------( 233      ( 11 Jul 2018 06:30 )             37.8     07-11    134<L>  |  98  |  16  ----------------------------<  95  4.4   |  26  |  1.1    Ca    8.7      11 Jul 2018 06:30    TPro  7.4  /  Alb  4.7  /  TBili  <0.2  /  DBili  x   /  AST  39  /  ALT  36  /  AlkPhos  44  07-09          Lactate Trend  07-10 @ 11:19 Lactate:1.2   07-09 @ 17:32 Lactate:1.5         CAPILLARY BLOOD GLUCOSE              **************************Active Medications *******************************************  No Known Allergies      ALPRAZolam 0.5 milliGRAM(s) Oral two times a day PRN  aspirin  chewable 81 milliGRAM(s) Oral daily  dexamethasone 0.1% Ophthalmic Solution for OTIC Use 4 Drop(s) Right Ear two times a day  heparin  Injectable 5000 Unit(s) SubCutaneous every 8 hours  mupirocin 2% Ointment 1 Application(s) Topical every 12 hours  tamsulosin 0.4 milliGRAM(s) Oral at bedtime      ***************************************************  RADIOLOGY & ADDITIONAL TESTS:    Imaging Personally Reviewed:  [ ] YES  [ ] NO    HEALTH ISSUES - PROBLEM Dx:

## 2018-07-15 LAB
CULTURE RESULTS: SIGNIFICANT CHANGE UP
SPECIMEN SOURCE: SIGNIFICANT CHANGE UP

## 2018-07-17 DIAGNOSIS — N40.0 BENIGN PROSTATIC HYPERPLASIA WITHOUT LOWER URINARY TRACT SYMPTOMS: ICD-10-CM

## 2018-07-17 DIAGNOSIS — H60.91 UNSPECIFIED OTITIS EXTERNA, RIGHT EAR: ICD-10-CM

## 2018-07-17 DIAGNOSIS — H61.001 UNSPECIFIED PERICHONDRITIS OF RIGHT EXTERNAL EAR: ICD-10-CM

## 2018-07-17 DIAGNOSIS — F41.9 ANXIETY DISORDER, UNSPECIFIED: ICD-10-CM

## 2018-07-17 DIAGNOSIS — H70.90 UNSPECIFIED MASTOIDITIS, UNSPECIFIED EAR: ICD-10-CM

## 2018-07-17 DIAGNOSIS — G11.1 EARLY-ONSET CEREBELLAR ATAXIA: ICD-10-CM

## 2018-07-17 DIAGNOSIS — F17.210 NICOTINE DEPENDENCE, CIGARETTES, UNCOMPLICATED: ICD-10-CM

## 2018-07-17 DIAGNOSIS — H60.10 CELLULITIS OF EXTERNAL EAR, UNSPECIFIED EAR: ICD-10-CM

## 2018-07-17 DIAGNOSIS — F32.9 MAJOR DEPRESSIVE DISORDER, SINGLE EPISODE, UNSPECIFIED: ICD-10-CM

## 2018-12-29 ENCOUNTER — EMERGENCY (EMERGENCY)
Facility: HOSPITAL | Age: 73
LOS: 0 days | Discharge: HOME | End: 2018-12-30
Attending: EMERGENCY MEDICINE | Admitting: EMERGENCY MEDICINE

## 2018-12-29 VITALS
HEIGHT: 69 IN | WEIGHT: 179.9 LBS | TEMPERATURE: 98 F | HEART RATE: 88 BPM | DIASTOLIC BLOOD PRESSURE: 64 MMHG | OXYGEN SATURATION: 96 % | SYSTOLIC BLOOD PRESSURE: 98 MMHG | RESPIRATION RATE: 18 BRPM

## 2018-12-29 DIAGNOSIS — Z79.899 OTHER LONG TERM (CURRENT) DRUG THERAPY: ICD-10-CM

## 2018-12-29 DIAGNOSIS — Z79.82 LONG TERM (CURRENT) USE OF ASPIRIN: ICD-10-CM

## 2018-12-29 DIAGNOSIS — M25.562 PAIN IN LEFT KNEE: ICD-10-CM
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
## 2018-12-29 DIAGNOSIS — F32.9 MAJOR DEPRESSIVE DISORDER, SINGLE EPISODE, UNSPECIFIED: ICD-10-CM

## 2018-12-29 DIAGNOSIS — S82.142A DISPLACED BICONDYLAR FRACTURE OF LEFT TIBIA, INITIAL ENCOUNTER FOR CLOSED FRACTURE: ICD-10-CM
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
## 2018-12-29 RX ORDER — ASPIRIN/CALCIUM CARB/MAGNESIUM 324 MG
1 TABLET ORAL
Qty: 0 | Refills: 0 | COMMUNITY

## 2018-12-29 RX ORDER — ALPRAZOLAM 0.25 MG
1 TABLET ORAL
Qty: 0 | Refills: 0 | COMMUNITY

## 2018-12-29 RX ORDER — VILAZODONE HYDROCHLORIDE 20 MG/1
1 TABLET, FILM COATED ORAL
Qty: 0 | Refills: 0 | COMMUNITY

## 2018-12-29 RX ORDER — SILODOSIN 4 MG/1
1 CAPSULE ORAL
Qty: 0 | Refills: 0 | COMMUNITY

## 2018-12-29 NOTE — ED PROVIDER NOTE - NSFOLLOWUPCLINICS_GEN_ALL_ED_FT
[facility]  Orthpedic  [address], [place]   Phone: [phone]  Fax:   Follow Up Time:

## 2018-12-29 NOTE — ED ADULT NURSE NOTE - NSIMPLEMENTINTERV_GEN_ALL_ED
Implemented All Fall Risk Interventions:  [place] to call system. Call bell, personal items and telephone within reach. Instruct patient to call for assistance. Room bathroom lighting operational. Non-slip footwear when patient is off stretcher. Physically safe environment: no spills, clutter or unnecessary equipment. Stretcher in lowest position, wheels locked, appropriate side rails in place. Provide visual cue, wrist band, yellow gown, etc. Monitor gait and stability. Monitor for mental status changes and reorient to person, place, and time. Review medications for side effects contributing to fall risk. Reinforce activity limits and safety measures with patient and family.

## 2018-12-29 NOTE — ED PROVIDER NOTE - NS ED ROS FT
Review of Systems:  CONSTITUTIONAL: no fever  EYES: no photophobia, no blurred vision  ENT: no ear pain, no nasal discharge  RESPIRATORY: no shortness of breath, no cough  CARDIAC: no chest pain, no palpitations  GI: no abd pain, no nausea, no vomiting,   : no dysuria; no hematuria,   MUSCULOSKELETAL: +L knee pain  NEUROLOGIC: no numbness/tingling/weakness, no LOC

## 2018-12-29 NOTE — ED PROVIDER NOTE - PHYSICAL EXAMINATION
VITAL SIGNS: I have reviewed nursing notes and confirm.  CONSTITUTIONAL: Well-developed; well-nourished; in no acute distress.  SKIN: Skin exam is warm and dry, <2 sec cap refill  HEAD: Normocephalic; atraumatic.  CARD: RRR, 2+ dp pulses  RESP: No wheezes, rales or rhonchi, speaking in full sentences  ABD: soft non tender.   EXT: FROM of L knee. mildly TTP to medial aspect of patella. NVI.   NEURO: Alert, oriented. Grossly unremarkable. No focal deficits. VITAL SIGNS: I have reviewed nursing notes and confirm.  CONSTITUTIONAL: Well-developed; well-nourished; in no acute distress.  SKIN: Skin exam is warm and dry, <2 sec cap refill  HEAD: Normocephalic; atraumatic.  CARD: RRR, 2+ dp pulses  RESP: No wheezes, rales or rhonchi, speaking in full sentences  ABD: soft non tender.   EXT: FROM of L knee. mildly TTP to medial aspect of patella. NVI. pt is able to stand and bear weight. ambulates with a limp  NEURO: Alert, oriented. Grossly unremarkable. No focal deficits.

## 2018-12-29 NOTE — ED PROVIDER NOTE - ATTENDING CONTRIBUTION TO CARE
I was present for and supervised the key and critical aspects of the procedures performed during the care of the patient. Patient presents for evaluation of left knee pain that is moderate and throbbing in nature worse with movement sustained after a fall he denies any loc and deneis any vomiting the patient has no complaints of pain at this time he is nc/at perrla eomi oropharynx clear cta b/l, rrr s1s2 noted abd-soft nt nd bs+ ext- patient has ttp of the left knee with no effusion noted pedal pulses 2+=, anterior posterior drawer sign negative. we obtained an xray which reveals tibial plat fracture this was followed by ct the patient was placed in a knee immobilizer made nw and discharged to follow up with ortho.  we discussed indications to return at this tiem

## 2018-12-29 NOTE — ED ADULT TRIAGE NOTE - CHIEF COMPLAINT QUOTE
Pt s/p fall down basement steps (6) at home inside. C/O pain to left knee and inability to apply pressure; denies hitting head; denies LOC.

## 2018-12-29 NOTE — ED PROVIDER NOTE - NSFOLLOWUPINSTRUCTIONS_ED_ALL_ED_FT
Follow up with orthopedic team. use knee immobilizer and crutches.  Fracture    A fracture is a break in one of your bones. This can occur from a variety of injuries, especially traumatic ones. Symptoms include pain, bruising, or swelling. Do not use the injured limb. If a fracture is in one of the bones below your waist, do not put weight on that limb unless instructed to do so by your healthcare provider. Crutches or a cane may have been provided. A splint or cast may have been applied by your health care provider. Make sure to keep it dry and follow up with an orthopedist as instructed.    SEEK IMMEDIATE MEDICAL CARE IF YOU HAVE ANY OF THE FOLLOWING SYMPTOMS: numbness, tingling, increasing pain, or weakness in any part of the injured limb.

## 2018-12-29 NOTE — ED PROVIDER NOTE - CARE PROVIDER_API CALL
Tej Nieves (MD), Orthopaedic Surgery  [facility] [place] [address]  Phone: [phone]  Fax: [phone]

## 2018-12-30 PROBLEM — N40.0 BENIGN PROSTATIC HYPERPLASIA WITHOUT LOWER URINARY TRACT SYMPTOMS: Chronic | Status: ACTIVE | Noted: 2018-07-10

## 2018-12-30 PROBLEM — F32.9 MAJOR DEPRESSIVE DISORDER, SINGLE EPISODE, UNSPECIFIED: Chronic | Status: ACTIVE | Noted: 2018-07-10

## 2018-12-30 PROBLEM — F41.9 ANXIETY DISORDER, UNSPECIFIED: Chronic | Status: ACTIVE | Noted: 2018-07-05

## 2018-12-30 RX ORDER — OXYCODONE AND ACETAMINOPHEN 5; 325 MG/1; MG/1
1 TABLET ORAL ONCE
Qty: 0 | Refills: 0 | Status: DISCONTINUED | OUTPATIENT
Start: 2018-12-30 | End: 2018-12-30

## 2018-12-30 RX ADMIN — OXYCODONE AND ACETAMINOPHEN 1 TABLET(S): 5; 325 TABLET ORAL at 01:29

## 2021-06-05 NOTE — ED ADULT TRIAGE NOTE - SPO2 (%)
Progress Note      Patient Name: Jackie Barcenas   Patient ID: [id number]   Sex: Female   YOB: 1948    Primary Care Provider: Sharon KATHLEEN   Referring Provider: Sharon KATHLEEN    Visit Date: May 13, 2021    Provider: HEVER Tobias   Location: [facility]   Location Address: [address] [place] [address] [place]LO Terry  [id number]   Location Phone: [phone]          [facility] ([id number], [id number], [id number]) - -  96

## 2021-07-14 NOTE — ED ADULT NURSE NOTE - PAIN: PRESENCE, MLM
Physical Exam  GEN: Awake, alert, non-toxic appearing, NCAT  EYES: full EOMI,  ENT: External inspection normal, normal voice,   HEAD: atraumatic  NECK: FROM neck, supple,   RESP: no tachypnea, no hypoxia, no resp distress,  GI: nontender  : no CVAT  MSK: paiz x 4  SKIN: no rash complains of pain/discomfort Physical Exam  GEN: Awake, alert, non-toxic appearing, NCAT  EYES: full EOMI,  ENT: External inspection normal, normal voice,   HEAD: atraumatic  NECK: FROM neck, supple,   RESP: no tachypnea, no hypoxia, no resp distress,  GI: nontender, nondistended  : no CVAT  MSK: paiz x 4  SKIN: no rash

## 2023-08-30 NOTE — ED PROCEDURE NOTE - ATTENDING CONTRIBUTION TO CARE
I was present for and supervised the key and critical aspects of the procedures performed during the care of the patient. Universal Safety Interventions

## 2023-09-19 ENCOUNTER — EMERGENCY (EMERGENCY)
Facility: HOSPITAL | Age: 78
LOS: 0 days | Discharge: HOME | End: 2023-09-20
Attending: EMERGENCY MEDICINE
Payer: MEDICARE

## 2023-09-19 VITALS
HEART RATE: 78 BPM | DIASTOLIC BLOOD PRESSURE: 86 MMHG | TEMPERATURE: 98 F | OXYGEN SATURATION: 99 % | SYSTOLIC BLOOD PRESSURE: 126 MMHG | WEIGHT: 164.91 LBS | RESPIRATION RATE: 18 BRPM | HEIGHT: 69 IN

## 2023-09-19 DIAGNOSIS — F03.A0 UNSPECIFIED DEMENTIA, MILD, WITHOUT BEHAVIORAL DISTURBANCE, PSYCHOTIC DISTURBANCE, MOOD DISTURBANCE, AND ANXIETY: ICD-10-CM

## 2023-09-19 DIAGNOSIS — H57.89 OTHER SPECIFIED DISORDERS OF EYE AND ADNEXA: ICD-10-CM

## 2023-09-19 DIAGNOSIS — Z87.438 PERSONAL HISTORY OF OTHER DISEASES OF MALE GENITAL ORGANS: ICD-10-CM

## 2023-09-19 DIAGNOSIS — F41.9 ANXIETY DISORDER, UNSPECIFIED: ICD-10-CM

## 2023-09-19 DIAGNOSIS — H10.9 UNSPECIFIED CONJUNCTIVITIS: ICD-10-CM

## 2023-09-19 DIAGNOSIS — R09.81 NASAL CONGESTION: ICD-10-CM

## 2023-09-19 DIAGNOSIS — I10 ESSENTIAL (PRIMARY) HYPERTENSION: ICD-10-CM

## 2023-09-19 PROCEDURE — 99284 EMERGENCY DEPT VISIT MOD MDM: CPT

## 2023-09-19 PROCEDURE — 99283 EMERGENCY DEPT VISIT LOW MDM: CPT

## 2023-09-19 RX ORDER — ERYTHROMYCIN BASE 5 MG/GRAM
1 OINTMENT (GRAM) OPHTHALMIC (EYE) ONCE
Refills: 0 | Status: COMPLETED | OUTPATIENT
Start: 2023-09-19 | End: 2023-09-19

## 2023-09-19 NOTE — ED PROVIDER NOTE - NSFOLLOWUPCLINICS_GEN_ALL_ED_FT
[facility]  Ophthalmolgy  [address], [address]  [place] [address]  Phone: [phone]  Fax:   Follow Up Time: 4-6 Days

## 2023-09-19 NOTE — ED PROVIDER NOTE - OBJECTIVE STATEMENT
79 yo M, hx of BPH, mild dementia, anxiety, HTN here for assessment of L eye redness, discharge x 3 days -- had mild recent nasal congestion, otherwise no recent illness. No change in vision, FB sensation. Notes eye feels itchy. No pain, FB sensation.     No recent trauma, chemical exposure.

## 2023-09-19 NOTE — ED PROVIDER NOTE - NSFOLLOWUPINSTRUCTIONS_ED_ALL_ED_FT
USE THE PROVIDED ANTIBIOTIC OINTMENT, 1 CM RIBBON INTO L EYE 4 TIMES A DAY FOR 7 DAYS,    Bacterial Conjunctivitis, Adult  Bacterial conjunctivitis is an infection of the clear membrane that covers the white part of the eye and the inner surface of the eyelid (conjunctiva). When the blood vessels in the conjunctiva become inflamed, the eye becomes red or pink. The eye often feels irritated or itchy. Bacterial conjunctivitis spreads easily from person to person (is contagious). It also spreads easily from one eye to the other eye.    What are the causes?  This condition is caused by bacteria. You may get the infection if you come into close contact with:  A person who is infected with the bacteria.  Items that are contaminated with the bacteria, such as a face towel, contact lens solution, or eye makeup.  What increases the risk?  You are more likely to develop this condition if:  You are exposed to other people who have the infection.  You wear contact lenses.  You have a sinus infection.  You have had a recent eye injury or surgery.  You have a weak body defense system (immune system).  You have a medical condition that causes dry eyes.  What are the signs or symptoms?  A normal eye compared to an eye with bacterial conjunctivitis.   Symptoms of this condition include:  Thick, yellowish discharge from the eye. This may turn into a crust on the eyelid overnight and cause your eyelids to stick together.  Tearing or watery eyes.  Itchy eyes.  Burning feeling in your eyes.  Eye redness.  Swollen eyelids.  Blurred vision.  How is this diagnosed?  This condition is diagnosed based on your symptoms and medical history. Your health care provider may also take a sample of discharge from your eye to find the cause of your infection.    How is this treated?  A person putting eye drops in an eye.  This condition may be treated with:  Antibiotic eye drops or ointment to clear the infection more quickly and prevent the spread of infection to others.  Antibiotic medicines taken by mouth (orally) to treat infections that do not respond to drops or ointments or that last longer than 10 days.  Cool, wet cloths (cool compresses) placed on the eyes.  Artificial tears applied 2–6 times a day.  Follow these instructions at home:  Medicines    Take or apply your antibiotic medicine as told by your health care provider. Do not stop using the antibiotic, even if your condition improves, unless directed by your health care provider.  Take or apply over-the-counter and prescription medicines only as told by your health care provider.  Be very careful to avoid touching the edge of your eyelid with the eye-drop bottle or the ointment tube when you apply medicines to the affected eye. This will keep you from spreading the infection to your other eye or to other people.  Managing discomfort    Gently wipe away any drainage from your eye with a warm, wet washcloth or a cotton ball.  Apply a clean, cool compress to your eye for 10–20 minutes, 3–4 times a day.  General instructions    Do not wear contact lenses until the inflammation is gone and your health care provider says it is safe to wear them again. Ask your health care provider how to sterilize or replace your contact lenses before you use them again. Wear glasses until you can resume wearing contact lenses.  Avoid wearing eye makeup until the inflammation is gone. Throw away any old eye cosmetics that may be contaminated.  Change or wash your pillowcase every day.  Do not share towels or washcloths. This may spread the infection.  Wash your hands often with soap and water for at least 20 seconds and especially before touching your face or eyes. Use paper towels to dry your hands.  Avoid touching or rubbing your eyes.  Do not drive or use heavy machinery if your vision is blurred.  Contact a health care provider if:  You have a fever.  Your symptoms do not get better after 10 days.  Get help right away if:  You have a fever and your symptoms suddenly get worse.  You have severe pain when you move your eye.  You have facial pain, redness, or swelling.  You have a sudden loss of vision.  Summary  Bacterial conjunctivitis is an infection of the clear membrane that covers the white part of the eye and the inner surface of the eyelid (conjunctiva).  Bacterial conjunctivitis spreads easily from eye to eye and from person to person (is contagious).  Wash your hands often with soap and water for at least 20 seconds and especially before touching your face or eyes. Use paper towels to dry your hands.  Take or apply your antibiotic medicine as told by your health care provider. Do not stop using the antibiotic even if your condition improves.  Contact a health care provider if you have a fever or if your symptoms do not get better after 10 days. Get help right away if you have a sudden loss of vision.  This information is not intended to replace advice given to you by your health care provider. Make sure you discuss any questions you have with your health care provider.

## 2023-09-19 NOTE — ED PROVIDER NOTE - PHYSICAL EXAMINATION
VITAL SIGNS: I have reviewed nursing notes and confirm.  CONSTITUTIONAL: Well-developed; well-nourished; in no acute distress.  SKIN: Skin exam is warm and dry, no acute rash.  HEAD: Normocephalic; atraumatic.  EYES: PERRL, EOM intact and painless, no proptosis; L conjunctiva is injected, yellow crusting around lower lid, very mild matting of eye lashes.   ENT: No nasal discharge; airway clear.   CARD: S1, S2 normal; no murmurs, gallops, or rubs. Regular rate and rhythm.  RESP: No wheezes, rales or rhonchi.  ABD: Normal bowel sounds; soft; non-distended; non-tender.  EXT: Normal ROM.   NEURO: Alert, oriented. Grossly unremarkable. No focal deficits.  PSYCH: Cooperative, appropriate.

## 2023-09-19 NOTE — ED ADULT TRIAGE NOTE - CHIEF COMPLAINT QUOTE
My eye is all swollen and scaly, I want to make sure its not cellulitis. It's leaking  and itchy - patient

## 2023-09-19 NOTE — ED PROVIDER NOTE - PATIENT PORTAL LINK FT
You can access the FollowMyHealth Patient Portal offered by [facility] by registering at the following website: [email or web address]. By joining [facility]’s FollowMyHealth portal, you will also be able to view your health information using other applications (apps) compatible with our system.

## 2023-09-19 NOTE — ED PROVIDER NOTE - CLINICAL SUMMARY MEDICAL DECISION MAKING FREE TEXT BOX
Findings suggestive of conjunctivitis. No signs of periorbital or orbital cellulitis. No FB sensation, pain to suggest corneal abrasion/ulcer.    Sx may be allergic however will treat as bacterial with erythromycin ophthalmic.     Advised on close sx monitoring, return precautions, follow up.

## 2023-09-20 RX ADMIN — Medication 1 APPLICATION(S): at 00:03

## 2023-09-20 NOTE — ED ADULT NURSE NOTE - NSFALLUNIVINTERV_ED_ALL_ED
Bed/Stretcher in lowest position, wheels locked, appropriate side rails in place/Call bell, personal items and telephone in reach/Instruct patient to call for assistance before getting out of bed/chair/stretcher/Non-slip footwear applied when patient is off stretcher/[place] to call system/Physically safe environment - no spills, clutter or unnecessary equipment/Purposeful proactive rounding/Room/bathroom lighting operational, light cord in reach

## 2024-03-16 NOTE — ED PROVIDER NOTE - NS ED ROS FT
CONST: No fever, chills or bodyaches  EYES: No pain, redness, drainage or visual changes.  ENT No nasal discharge or congestion. No sore throat  CARD: No chest pain, palpitations  RESP: No SOB, cough, hemoptysis. No hx of asthma or COPD  GI: No abdominal pain, N/V/D  MS: No joint pain, back pain or extremity pain/injury  NEURO: No headache, dizziness, paresthesias or LOC normal...